# Patient Record
(demographics unavailable — no encounter records)

---

## 2025-02-03 NOTE — HISTORY OF PRESENT ILLNESS
[[email or web address]] : 43 y/o female presents c/o lump on her back for months. She was doing exercise that put pressure on it, but denies drainage or pain. Pt states weight loss has occurred with being more active.